# Patient Record
Sex: MALE | Race: WHITE
[De-identification: names, ages, dates, MRNs, and addresses within clinical notes are randomized per-mention and may not be internally consistent; named-entity substitution may affect disease eponyms.]

---

## 2018-03-09 ENCOUNTER — HOSPITAL ENCOUNTER (OUTPATIENT)
Dept: HOSPITAL 76 - DI | Age: 43
Discharge: HOME | End: 2018-03-09
Attending: GENERAL PRACTICE
Payer: COMMERCIAL

## 2018-03-09 DIAGNOSIS — M47.892: Primary | ICD-10-CM

## 2018-03-09 PROCEDURE — 72141 MRI NECK SPINE W/O DYE: CPT

## 2018-03-10 NOTE — MRI REPORT
EXAM:

MRI CERVICAL SPINE WITHOUT CONTRAST

 

EXAM DATE: 3/9/2018 05:11 PM.

 

CLINICAL HISTORY: Cervicalgia.

 

COMPARISONS: None.

 

TECHNIQUE: Multiplanar, multisequence T1-weighted and fluid-sensitive sequences of the cervical spine
 without contrast. Other: None.

 

FINDINGS: 

Neurologic Structures: The visualized posterior fossa structures are unremarkable. No signal abnormal
ity in the visualized spinal cord.

 

Alignment: There is no spondylolisthesis.

 

Bone Marrow: No gross fractures or bone lesions. No marrow edema.

 

Interspace Levels/Facets:

C1-C2: Unremarkable.

 

C2-C3: Unremarkable.

 

C3-C4: Unremarkable.

 

C4-C5: Mild left foraminal narrowing is present due to uncovertebral hypertrophy. The spinal canal an
d right foramen are patent.

 

C5-C6: Mild left foraminal narrowing is present due to uncovertebral hypertrophy. The spinal canal an
d right foramen are patent.

 

C6-C7: Unremarkable.

 

C7-T1: Unremarkable.

 

Musculature: Normal. No edema or fatty atrophy.

 

Other: The paravertebral and prevertebral soft tissues are normal.

 

IMPRESSION: 

1. Normal cervical spinal cord signal intensity without impingement. 

2. Mild left foraminal narrowing at C4-C5 and C5-C6.

 

[facility]

Referring Provider Line: [phone]

 

SITE ID: [id number]